# Patient Record
Sex: MALE | Race: WHITE
[De-identification: names, ages, dates, MRNs, and addresses within clinical notes are randomized per-mention and may not be internally consistent; named-entity substitution may affect disease eponyms.]

---

## 2019-01-10 ENCOUNTER — HOSPITAL ENCOUNTER (EMERGENCY)
Dept: HOSPITAL 76 - ED | Age: 34
Discharge: HOME | End: 2019-01-10
Payer: COMMERCIAL

## 2019-01-10 VITALS — SYSTOLIC BLOOD PRESSURE: 117 MMHG | DIASTOLIC BLOOD PRESSURE: 63 MMHG

## 2019-01-10 DIAGNOSIS — S83.92XA: Primary | ICD-10-CM
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
## 2019-01-10 PROCEDURE — 99282 EMERGENCY DEPT VISIT SF MDM: CPT

## 2019-01-10 PROCEDURE — 99283 EMERGENCY DEPT VISIT LOW MDM: CPT

## 2019-01-10 PROCEDURE — 73564 X-RAY EXAM KNEE 4 OR MORE: CPT

## 2019-01-10 NOTE — XRAY REPORT
Reason:  teist, lateral jt line pain

Procedure Date:  01/10/2019   

Accession Number:  [id number] / [id number]                    

Procedure:  XR  - Knee 4 View LT CPT Code:  

 

FULL RESULT:

 

 

EXAM:

LEFT KNEE RADIOGRAPHY 4 VIEWS

 

EXAM DATE: 1/10/2019 09:42 AM.

 

CLINICAL HISTORY: Twisting injury. Lateral joint line pain.

 

COMPARISON: None.

 

TECHNIQUE: AP, both oblique and lateral views.

 

FINDINGS:

Bones: Normal. No fractures or bone lesions.

 

Joints: Normal. No effusion. No subluxations.

 

Soft Tissues: Normal. No soft tissue swelling.

IMPRESSION: Normal left knee radiography.

 

[facility]

## 2019-01-10 NOTE — ED PHYSICIAN DOCUMENTATION
History of Present Illness





- Stated complaint


Stated Complaint: KNEE PAIN





- Chief complaint


Chief Complaint: Ext Problem





- Additonal information


Additional information: 





hx from pt


33 m


twisted L knee


pain primarily lateral


and some swelling


otherwise well





Review of Systems


Musculoskeletal: reports: Joint pain, Pain with weight bearing





PD PAST MEDICAL HISTORY





- Past Medical History


Past Medical History: No


Cardiovascular: None


Respiratory: None


Musculoskeletal: None





- Past Surgical History


Past Surgical History: Yes


Ortho: Rotator cuff repair





- Present Medications


Home Medications: 


                                Ambulatory Orders











 Medication  Instructions  Recorded  Confirmed


 


traMADol [Ultram] 50 mg PO HS 06/29/13 09/08/15


 


Acetaminophen  01/10/19 


 


Ibuprofen  01/10/19 














- Allergies


Allergies/Adverse Reactions: 


                                    Allergies











Allergy/AdvReac Type Severity Reaction Status Date / Time


 


No Known Drug Allergies Allergy   Verified 01/10/19 08:56














- Social History


Does the pt smoke?: Yes


Smoking Status: Current every day smoker


Does the pt drink ETOH?: No


Does the pt have substance abuse?: No





- Immunizations


Immunizations are current?: Yes





PD ED PE NORMAL





- Vitals


Vital signs reviewed: Yes





- Back


Back: Other (L knee: mild effusion, no deformity, mild patellar and lat jt line 

TTP, no ACL MCL LCL laxity, some discomfort no pop with meniscal testing)





Results





- Vitals


Vitals: 


                               Vital Signs - 24 hr











  01/10/19





  08:54


 


Temperature 36.6 C


 


Heart Rate 68


 


Respiratory 16





Rate 


 


Blood Pressure 117/63


 


O2 Saturation 100








                                     Oxygen











O2 Source                      Room air

















- Rads (name of study)


  ** L knee


Radiology: See rad report (normal)





Departure





- Departure


Disposition: 01 Home, Self Care


Clinical Impression: 


Knee sprain


Qualifiers:


 Encounter type: initial encounter Involved ligament of knee: unspecified 

ligament Laterality: left Qualified Code(s): S83.92XA - Sprain of unspecified 

site of left knee, initial encounter





Condition: Good


Instructions:  ED Meniscal Injury Knee Poss


Follow-Up: 


[facility] [Provider Group]


Comments: 


The xrays are fine - no bone injury


The major stabilizing ligaments (anterior medial and lateral collaterals) feel i

ntact on exam.


It is possible you injured a meniscus.


For now it is fine for you to go home


Recommend ice elevation and rest


Motrin for pain and inflammation.


If you continue to have pain with activities or if you notice your knee locking 

up, call the orthopedic office to schedule follow up and perhaps a MRI of the 

knee to further evaluate the meniscus.


If you are felling better in a few days you can just advance your activity and 

do not need to see orthopedics

## 2020-11-22 ENCOUNTER — HOSPITAL ENCOUNTER (EMERGENCY)
Dept: HOSPITAL 76 - ED | Age: 35
Discharge: HOME | End: 2020-11-22
Payer: COMMERCIAL

## 2020-11-22 VITALS — SYSTOLIC BLOOD PRESSURE: 134 MMHG | DIASTOLIC BLOOD PRESSURE: 85 MMHG

## 2020-11-22 DIAGNOSIS — M62.830: Primary | ICD-10-CM

## 2020-11-22 DIAGNOSIS — F17.200: ICD-10-CM
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
## 2020-11-22 PROCEDURE — 99284 EMERGENCY DEPT VISIT MOD MDM: CPT

## 2020-11-22 PROCEDURE — 1040M: CPT

## 2020-11-22 PROCEDURE — 99282 EMERGENCY DEPT VISIT SF MDM: CPT

## 2020-11-22 NOTE — ED PHYSICIAN DOCUMENTATION
PD HPI BACK PAIN





- Stated complaint


Stated Complaint: BACK PX





- Chief complaint


Chief Complaint: Back Pain





- History obtained from


History obtained from: Patient





- History of Present Illness


Timing - onset: How many days ago (2)


Timing - duration: Days (2)


Timing - details: Gradual onset


Pain level max: 7


Pain level now: 5


Location: Lower, Right


Quality: Pain, Spasm


Associated symptoms: No: Fever, Weakness, Numbness, Incontinent of urine, Unable

to urinate, Hematuria, Incontinent of stool


Improves with: Rest


Worsened by: Movement


Contributing factors: Other (states hurt his back moving a water cube at work.).

 No: Trauma, Anticoagulated, Cancer, IVDA, Out of meds


Recently seen: Not recently seen





- Additional information


Additional information: 





taking tylenol, motrin, tramadol without relief. 





Review of Systems


Constitutional: denies: Fever, Chills


GI: denies: Vomiting, Diarrhea


Skin: denies: Rash


Musculoskeletal: denies: Neck pain, Back pain


Neurologic: denies: Headache





PD PAST MEDICAL HISTORY





- Past Medical History


Cardiovascular: None


Respiratory: None


Musculoskeletal: None





- Past Surgical History


Past Surgical History: Yes


Ortho: Rotator cuff repair





- Present Medications


Home Medications: 


                                Ambulatory Orders











 Medication  Instructions  Recorded  Confirmed


 


traMADol [Ultram] 50 mg PO HS 06/29/13 09/08/15


 


Acetaminophen  01/10/19 


 


Ibuprofen  01/10/19 


 


HYDROcod/ACETAM 5/325 [Norco 5/325] 1 - 2 ea PO Q6H PRN #14 tablet 11/22/20 


 


methocarbamoL [Robaxin] 500 mg PO Q6H PRN #20 tablet 11/22/20 














- Allergies


Allergies/Adverse Reactions: 


                                    Allergies











Allergy/AdvReac Type Severity Reaction Status Date / Time


 


No Known Drug Allergies Allergy   Verified 11/22/20 15:31














- Social History


Does the pt smoke?: Yes


Smoking Status: Current every day smoker


Does the pt drink ETOH?: No


Does the pt have substance abuse?: No





- Immunizations


Immunizations are current?: Yes





PD ED PE NORMAL





- Vitals


Vital signs reviewed: Yes





- General


General: Alert and oriented X 3, No acute distress





- HEENT


HEENT: Moist mucous membranes





- Neck


Neck: Supple, no meningeal sign





- Cardiac


Cardiac: RRR





- Respiratory


Respiratory: No respiratory distress, Clear bilaterally





- Back


Back: Other (No midline tenderness to palpation or percussion.  Paraspinal spasm

 right greater than left.  Low lumbar. Normal bilateral lower extremity patellar

 and ankle jerk reflexes. Normal great toe extension bilaterally. no saddle 

anesthesia)





- Derm


Derm: Warm and dry





- Extremities


Extremities: Normal ROM s pain





- Neuro


Neuro: Alert and oriented X 3, No motor deficit, No sensory deficit





Results





- Vitals


Vitals: 





                               Vital Signs - 24 hr











  11/22/20





  15:32


 


Temperature 36.9 C


 


Heart Rate 78


 


Respiratory 18





Rate 


 


Blood Pressure 134/85 H


 


O2 Saturation 98








                                     Oxygen











O2 Source                      Room air

















PD MEDICAL DECISION MAKING





- ED course


Complexity details: considered differential (No cauda equina, no spinal epidural

 abscess, no fracture, no aortic dissection or evidence of aneursym rupture), 

d/w patient


ED course: 





Patient with what appears to be acute back spasm.  No evidence of cauda equina, 

epidural abscess.  Will prescribe pain medication and muscle relaxants for home.

  Encourage gentle stretching.  We will have him follow-up with his doctor for 

further care. No neurological compromise.  Patient counseled regarding signs and

 symptoms for which I believe and urgent re-evaluation would be necessary. 

Patient with good understanding of and agreement to plan and is comfortable 

going home at this time





This document was made in part using voice recognition software. While efforts 

are made to proofread this document, sound alike and grammatical errors may 

occur.





Departure





- Departure


Disposition: 01 Home, Self Care


Clinical Impression: 


 Back muscle spasm





Condition: Good


Instructions:  ED Spasm Back No Trauma


Follow-Up: 


Reji Mackay DO [Primary Care Provider] - Within 1 week


Prescriptions: 


HYDROcod/ACETAM 5/325 [Norco 5/325] 1 - 2 ea PO Q6H PRN #14 tablet


 PRN Reason: Pain


methocarbamoL [Robaxin] 500 mg PO Q6H PRN #20 tablet


 PRN Reason: back spasm


Comments: 


Use the medications as prescribed.  Return if you worsen.  Follow-up with your 

doctor later this week for further care. Continue gentle stretching at home.


Do not drink alcohol or drive while on narcotic pain medicine. 


Note that many narcotic pain relievers also contain tylenol/acetaminophen. 

Please ensure that your total dose of acetaminophen from all sources does not 

exceed 3 grams (3000mg) per day. 


You may constipated on this medication, take a stool softener such as "Colace" 

twice a day while you are on it. Also recommend a over-the-counter laxative such

 as senna or MiraLAX any day that you do not have a bowel movement. 


If you received narcotic pain medication in the emergency department, do not 

drive or operate machinery for the next 24 hours.

## 2021-01-24 ENCOUNTER — HOSPITAL ENCOUNTER (EMERGENCY)
Dept: HOSPITAL 76 - ED | Age: 36
Discharge: HOME | End: 2021-01-24
Payer: COMMERCIAL

## 2021-01-24 VITALS — DIASTOLIC BLOOD PRESSURE: 69 MMHG | SYSTOLIC BLOOD PRESSURE: 112 MMHG

## 2021-01-24 DIAGNOSIS — F17.200: ICD-10-CM

## 2021-01-24 DIAGNOSIS — M27.3: Primary | ICD-10-CM

## 2021-01-24 PROCEDURE — 96372 THER/PROPH/DIAG INJ SC/IM: CPT

## 2021-01-24 PROCEDURE — 99283 EMERGENCY DEPT VISIT LOW MDM: CPT

## 2021-01-24 PROCEDURE — 99284 EMERGENCY DEPT VISIT MOD MDM: CPT

## 2021-01-24 NOTE — ED PHYSICIAN DOCUMENTATION
PD HPI HEENT





- Stated complaint


Stated Complaint: MOUTH PX





- Chief complaint


Chief Complaint: Heent





- History obtained from


History obtained from: Patient





- History of Present Illness


Timing - onset: Today


Timing - duration: Hours


Timing - details: Abrupt onset, Still present


Location: Tooth


Improves: Medication


Worsens: Everything


Associated symptoms: No: Fever, Congestion, Rhinorrhea, Trismus, Unable to 

swallow, Swollen nodes, Facial swelling, Headache, Cough


Similar symptoms before: Has not had sx before


Recently seen: Surgery





- Additional information


Additional information: 


35-year-old male has had to up molars removed 3 days ago and he was having 

decent pain control with Percocet and ibuprofen and he this evening has had 

sudden increase in pain is become completely intolerable and he is come out to 

the emergency department for evaluation.  He has not had a fever or cough or 

vomiting.








Review of Systems


Constitutional: denies: Fever


Eyes: denies: Decreased vision


Ears: denies: Ear pain


Nose: denies: Rhinorrhea / runny nose, Congestion


Throat: reports: Dental pain / toothache


Cardiac: denies: Chest pain / pressure


Respiratory: denies: Dyspnea, Cough


GI: denies: Abdominal Pain, Nausea, Vomiting


: denies: Dysuria, Frequency





PD PAST MEDICAL HISTORY





- Past Medical History


Past Medical History: No


Cardiovascular: None


Respiratory: None


Musculoskeletal: None





- Past Surgical History


Past Surgical History: Yes


Ortho: Rotator cuff repair





- Present Medications


Home Medications: 


                                Ambulatory Orders











 Medication  Instructions  Recorded  Confirmed


 


Ibuprofen 800 mg PO DAILY 01/10/19 01/24/21


 


Amoxicillin 500 mg PO TID 01/24/21 01/24/21


 


Oxycodone HCl/Acetaminophen 1 tab PO Q6HR PRN 01/24/21 01/24/21





[Percocet  mg Tablet]   


 


Oxycodone HCl/Acetaminophen 1 - 2 each PO Q6H PRN #8 tablet 01/24/21 





[Percocet 5-325 mg Tablet]   














- Allergies


Allergies/Adverse Reactions: 


                                    Allergies











Allergy/AdvReac Type Severity Reaction Status Date / Time


 


No Known Drug Allergies Allergy   Verified 11/22/20 15:31














- Social History


Does the pt smoke?: Yes


Smoking Status: Current every day smoker


Does the pt drink ETOH?: No


Does the pt have substance abuse?: Yes


Substance Use and Type: Marijuana





- Immunizations


Immunizations are current?: Yes





PD ED PE NORMAL





- Vitals


Vital signs reviewed: Yes (normal )





- General


General: Alert and oriented X 3, Well developed/nourished, Other (The patient is

rocking back and forth crying in pain shaking with pain )





- HEENT


HEENT: Atraumatic, PERRL, EOMI, Other (There are 2 recently extracted teeth on 

the left upper. There is exposed alveolar bone. There is no bleeding or drainage

from the area. )





- Neck


Neck: Supple, no meningeal sign, No bony TTP





- Respiratory


Respiratory: No respiratory distress





- Derm


Derm: Normal color, Warm and dry, No rash





- Extremities


Extremities: No deformity, No edema





- Neuro


Neuro: Alert and oriented X 3, CNs 2-12 intact, No motor deficit, No sensory 

deficit, Normal speech


Eye Opening: Spontaneous


Motor: Obeys Commands


Verbal: Oriented


GCS Score: 15





- Psych


Psych: Normal affect, Other (mood is painful )





Results





- Vitals


Vitals: 





                               Vital Signs - 24 hr











  01/24/21





  02:13


 


Temperature 98.2 C H


 


Heart Rate 90


 


Respiratory 18





Rate 


 


Blood Pressure 118/63


 


O2 Saturation 97








                                     Oxygen











O2 Source                      Room air

















PD MEDICAL DECISION MAKING





- ED course


Complexity details: reviewed old records, re-evaluated patient, considered 

differential, d/w patient


ED course: 





35-year-old male has had a recent tooth extraction and now has dry socket.  He 

is in a lot of pain associated with this.  Dry socket paste is applied to both 

of the areas of extraction covering the exposed alveolar bone.  The patient is 

given 10 mg of dexamethasone and 60 mg of Toradol IM.  This does help with his 

pain tremendously and the patient is quite thankful.  Will provide some 

additional pain medication for the patient 





Departure





- Departure


Disposition: 01 Home, Self Care


Clinical Impression: 


 Dry socket





Condition: Stable


Instructions:  ED Socket Dry


Follow-Up: 


Reji Mackay DO [Primary Care Provider] - 


Prescriptions: 


Oxycodone HCl/Acetaminophen [Percocet 5-325 mg Tablet] 1 - 2 each PO Q6H PRN #8 

tablet


 PRN Reason: pain

## 2023-01-02 ENCOUNTER — HOSPITAL ENCOUNTER (OUTPATIENT)
Dept: HOSPITAL 76 - EMS | Age: 38
End: 2023-01-02
Payer: SELF-PAY

## 2023-01-02 DIAGNOSIS — R55: Primary | ICD-10-CM

## 2023-05-01 ENCOUNTER — HOSPITAL ENCOUNTER (OUTPATIENT)
Dept: HOSPITAL 76 - EMS | Age: 38
End: 2023-05-01
Payer: SELF-PAY

## 2023-05-01 DIAGNOSIS — R11.10: Primary | ICD-10-CM

## 2023-06-26 ENCOUNTER — HOSPITAL ENCOUNTER (OUTPATIENT)
Dept: HOSPITAL 76 - EMS | Age: 38
End: 2023-06-26
Payer: COMMERCIAL

## 2023-06-26 DIAGNOSIS — Z04.1: Primary | ICD-10-CM

## 2024-08-08 ENCOUNTER — HOSPITAL ENCOUNTER (EMERGENCY)
Dept: HOSPITAL 76 - ED | Age: 39
Discharge: HOME | End: 2024-08-08
Payer: MEDICAID

## 2024-08-08 VITALS — OXYGEN SATURATION: 99 % | SYSTOLIC BLOOD PRESSURE: 110 MMHG | DIASTOLIC BLOOD PRESSURE: 68 MMHG

## 2024-08-08 DIAGNOSIS — F17.200: ICD-10-CM

## 2024-08-08 DIAGNOSIS — B34.9: Primary | ICD-10-CM

## 2024-08-08 DIAGNOSIS — R50.9: ICD-10-CM

## 2024-08-08 LAB
B PARAPERT DNA SPEC QL NAA+PROBE: NOT DETECTED
B PERT DNA SPEC QL NAA+PROBE: NOT DETECTED
C PNEUM DNA NPH QL NAA+NON-PROBE: NOT DETECTED
FLUAV RNA RESP QL NAA+PROBE: NOT DETECTED
HAEM INFLU B DNA SPEC QL NAA+PROBE: NOT DETECTED
HCOV 229E RNA SPEC QL NAA+PROBE: NOT DETECTED
HCOV HKU1 RNA UPPER RESP QL NAA+PROBE: NOT DETECTED
HCOV NL63 RNA ASPIRATE QL NAA+PROBE: NOT DETECTED
HCOV OC43 RNA SPEC QL NAA+PROBE: NOT DETECTED
HMPV AG SPEC QL: NOT DETECTED
HPIV1 RNA NPH QL NAA+PROBE: NOT DETECTED
HPIV2 SPEC QL CULT: NOT DETECTED
HPIV3 AB TITR SER CF: NOT DETECTED {TITER}
HPIV4 RNA SPEC QL NAA+PROBE: NOT DETECTED
M PNEUMO DNA SPEC QL NAA+PROBE: NOT DETECTED
RSV RNA RESP QL NAA+PROBE: NOT DETECTED
RV+EV RNA SPEC QL NAA+PROBE: NOT DETECTED
SARS-COV-2 RNA PNL SPEC NAA+PROBE: DETECTED

## 2024-08-08 PROCEDURE — 99282 EMERGENCY DEPT VISIT SF MDM: CPT

## 2024-08-08 PROCEDURE — 87633 RESP VIRUS 12-25 TARGETS: CPT

## 2024-08-08 PROCEDURE — 99283 EMERGENCY DEPT VISIT LOW MDM: CPT

## 2024-08-08 RX ADMIN — ACETAMINOPHEN STA MG: 500 TABLET ORAL at 17:38

## 2024-08-08 RX ADMIN — IBUPROFEN STA MG: 600 TABLET, FILM COATED ORAL at 17:38

## 2024-08-08 NOTE — ED PHYSICIAN DOCUMENTATION
PD HPI URI





- Stated complaint


Stated Complaint: FEVER/HA/BACK PX





- Chief complaint


Chief Complaint: Fever





- History obtained from


History obtained from: Patient, Family (spouse noted him lethargic/confused with

headache and temp to 104 measured.)





- History of Present Illness


Timing - onset: Today (Much higher fever and general aches and headache today), 

How many days ago (1-2)


Timing duration: Days (1-2)


Timing details: Abrupt onset, Still present


Associated symptoms: Fever (The patient had a temperature up to 104 earlier a

ssociated with headache and some confusion and bodyaches.  It decreased some en 

route here and he was feeling less symptomatic in general.), Chills, Nasal 

congestion, Other (body ahces and back pain, headache.).  No: Sore throat, Dry 

cough, Dyspnea


Contributing factors: No: Sick contact, Travel, Immunocompromised





Review of Systems


Constitutional: reports: Fever (to 104 measured 2 hours ago, slowly lessening 

enroute here.)


Nose: reports: Congestion


Throat: denies: Sore throat


GI: denies: Abdominal Pain, Nausea, Vomiting, Diarrhea





PD PAST MEDICAL HISTORY





- Past Medical History


Past Medical History: No


Cardiovascular: None


Respiratory: None


Neuro: None


Endocrine/Autoimmune: None


GI: None


: None


HEENT: None


Psych: None


Musculoskeletal: None


Derm: None





- Past Surgical History


Past Surgical History: Yes


Ortho: Rotator cuff repair





- Present Medications


Home Medications: 


                                Ambulatory Orders











 Medication  Instructions  Recorded  Confirmed


 


Methadone HCl [Methadose] 40 mg PO DAILY 08/08/24 08/08/24














- Allergies


Allergies/Adverse Reactions: 


                                    Allergies











Allergy/AdvReac Type Severity Reaction Status Date / Time


 


No Known Drug Allergies Allergy   Verified 08/08/24 17:05














- Social History


Does the pt smoke?: Yes


Smoking Status: Current every day smoker


Does the pt drink ETOH?: No


Does the pt have substance abuse?: Yes





- Immunizations


Immunizations are current?: Yes





- POLST


Patient has POLST: No





PD ED PE NORMAL





- Vitals


Vital signs reviewed: Yes





- General


General: Alert and oriented X 3, No acute distress, Well developed/nourished





- HEENT


HEENT: Ears normal, Pharynx benign.  No: Moist mucous membranes





- Neck


Neck: Supple, no meningeal sign, No adenopathy





- Cardiac


Cardiac: RRR, No murmur





- Respiratory


Respiratory: Clear bilaterally





- Abdomen


Abdomen: Soft, Non tender





- Derm


Derm: Normal color, Warm and dry





- Neuro


Neuro: Alert and oriented X 3, No motor deficit, Normal speech





Results





- Vitals


Vitals: 


                               Vital Signs - 24 hr











  08/08/24





  16:59


 


Temperature 37.9 C


 


Heart Rate 74


 


Respiratory 16





Rate 


 


Blood Pressure 113/72


 


O2 Saturation 100








                                     Oxygen











O2 Source                      Room air

















PD Medical Decision Making





- ED course


Complexity details: considered differential (The patient is having general 

flulike symptoms for 1 or 2 days with aches and chills congestion and mild 

cough.  Considerably higher fever to 104 and couple of hours ago with some 

confusion headache and back pain.  Improved en route.), d/w patient, d/w family


ED course: 





The clinically the patient sounds like a viral syndrome with symptoms of 

headache and confusion improved as his temperature came down compared to 

prehospital.  As such he clinically does not appear meningitic.  Shared decision

 was not to undergo further testing such as blood tests or no indication for 

lumbar puncture.  At this point just the viral testing.





Departure





- Departure


Disposition: 01 Home, Self Care


Clinical Impression: 


 Fever, Acute viral syndrome





Condition: Stable


Record reviewed to determine appropriate education?: Yes


Instructions:  ED Viral Syndrome


Comments: 


Your respiratory viral test will take a little bit for the results, commonly an 

hour or 2.  As such we can have you off to be headed home and we can call with 

the results of it.  Your symptoms do sound likely to be a viral illness such as 

flu COVID adenovirus etc.  Antiviral medicines are mediocre for these and the 

only 1 that may have some benefit would be for influenza flu itself.  If that is

 positive on your test, I can send in an antiviral medicine to the pharmacy.





Otherwise mostly supportive treatment.  Plan on your fevers and muscle aches 

etc. being up and down for the next few days and take Tylenol/acetaminophen 500 

to 650 mg 4 times daily regularly.  To that add ibuprofen 600 mg 3 times a day 

as well for aches and fevers.





Stay well-hydrated.





Return to the ER if you have increasing general symptoms.  You do look well 

enough at this time with your fever down that I do not feel it clinically to be 

more severe disease such as meningitis etc.  Return if worsening.


Forms:  PCP List